# Patient Record
Sex: MALE | Race: WHITE | NOT HISPANIC OR LATINO | URBAN - METROPOLITAN AREA
[De-identification: names, ages, dates, MRNs, and addresses within clinical notes are randomized per-mention and may not be internally consistent; named-entity substitution may affect disease eponyms.]

---

## 2017-03-30 ENCOUNTER — APPOINTMENT (RX ONLY)
Dept: URBAN - METROPOLITAN AREA CLINIC 349 | Facility: CLINIC | Age: 57
Setting detail: DERMATOLOGY
End: 2017-03-30

## 2017-03-30 DIAGNOSIS — Z85.828 PERSONAL HISTORY OF OTHER MALIGNANT NEOPLASM OF SKIN: ICD-10-CM

## 2017-03-30 DIAGNOSIS — L82.1 OTHER SEBORRHEIC KERATOSIS: ICD-10-CM

## 2017-03-30 DIAGNOSIS — L57.0 ACTINIC KERATOSIS: ICD-10-CM

## 2017-03-30 DIAGNOSIS — D18.0 HEMANGIOMA: ICD-10-CM

## 2017-03-30 PROBLEM — D18.01 HEMANGIOMA OF SKIN AND SUBCUTANEOUS TISSUE: Status: ACTIVE | Noted: 2017-03-30

## 2017-03-30 PROBLEM — I10 ESSENTIAL (PRIMARY) HYPERTENSION: Status: ACTIVE | Noted: 2017-03-30

## 2017-03-30 PROCEDURE — 17003 DESTRUCT PREMALG LES 2-14: CPT

## 2017-03-30 PROCEDURE — ? COUNSELING

## 2017-03-30 PROCEDURE — 17000 DESTRUCT PREMALG LESION: CPT

## 2017-03-30 PROCEDURE — ? LIQUID NITROGEN

## 2017-03-30 PROCEDURE — 99213 OFFICE O/P EST LOW 20 MIN: CPT | Mod: 25

## 2017-03-30 ASSESSMENT — LOCATION SIMPLE DESCRIPTION DERM
LOCATION SIMPLE: RIGHT UPPER BACK
LOCATION SIMPLE: UPPER BACK
LOCATION SIMPLE: LEFT FOREHEAD
LOCATION SIMPLE: LEFT UPPER BACK
LOCATION SIMPLE: RIGHT CHEEK

## 2017-03-30 ASSESSMENT — PAIN INTENSITY VAS: HOW INTENSE IS YOUR PAIN 0 BEING NO PAIN, 10 BEING THE MOST SEVERE PAIN POSSIBLE?: NO PAIN

## 2017-03-30 ASSESSMENT — LOCATION DETAILED DESCRIPTION DERM
LOCATION DETAILED: LEFT INFERIOR LATERAL FOREHEAD
LOCATION DETAILED: INFERIOR THORACIC SPINE
LOCATION DETAILED: RIGHT MID-UPPER BACK
LOCATION DETAILED: LEFT MEDIAL UPPER BACK
LOCATION DETAILED: RIGHT SUPERIOR LATERAL BUCCAL CHEEK
LOCATION DETAILED: LEFT MID-UPPER BACK

## 2017-03-30 ASSESSMENT — LOCATION ZONE DERM
LOCATION ZONE: TRUNK
LOCATION ZONE: FACE

## 2017-03-30 NOTE — PROCEDURE: LIQUID NITROGEN
Render Post-Care Instructions In Note?: no
Number Of Freeze-Thaw Cycles: 2 freeze-thaw cycles
Post-Care Instructions: I reviewed with the patient in detail post-care instructions. Patient is to wear sunprotection, and avoid picking at any of the treated lesions. Pt may apply Vaseline to crusted or scabbing areas.
Duration Of Freeze Thaw-Cycle (Seconds): 4
Detail Level: Detailed
Consent: The patient's consent was obtained including but not limited to risks of crusting, scabbing, blistering, scarring, darker or lighter pigmentary change, recurrence, incomplete removal and infection.

## 2017-05-11 ENCOUNTER — APPOINTMENT (RX ONLY)
Dept: URBAN - METROPOLITAN AREA CLINIC 349 | Facility: CLINIC | Age: 57
Setting detail: DERMATOLOGY
End: 2017-05-11

## 2017-05-11 DIAGNOSIS — L23.9 ALLERGIC CONTACT DERMATITIS, UNSPECIFIED CAUSE: ICD-10-CM

## 2017-05-11 PROCEDURE — ? COUNSELING

## 2017-05-11 PROCEDURE — ? PRESCRIPTION

## 2017-05-11 PROCEDURE — ? RECOMMENDATIONS

## 2017-05-11 PROCEDURE — 99213 OFFICE O/P EST LOW 20 MIN: CPT

## 2017-05-11 RX ORDER — TRIAMCINOLONE ACETONIDE 1 MG/G
CREAM TOPICAL
Qty: 1 | Refills: 2 | Status: ERX | COMMUNITY
Start: 2017-05-11

## 2017-05-11 RX ADMIN — TRIAMCINOLONE ACETONIDE: 1 CREAM TOPICAL at 18:22

## 2017-05-11 ASSESSMENT — LOCATION ZONE DERM: LOCATION ZONE: LEG

## 2017-05-11 ASSESSMENT — LOCATION DETAILED DESCRIPTION DERM
LOCATION DETAILED: RIGHT PROXIMAL PRETIBIAL REGION
LOCATION DETAILED: LEFT PROXIMAL PRETIBIAL REGION

## 2017-05-11 ASSESSMENT — LOCATION SIMPLE DESCRIPTION DERM
LOCATION SIMPLE: LEFT PRETIBIAL REGION
LOCATION SIMPLE: RIGHT PRETIBIAL REGION

## 2017-05-11 NOTE — PROCEDURE: RECOMMENDATIONS
Detail Level: Detailed
Recommendations (Free Text): Avoid soaps, lotions and detergents with fragrance \\nTriamcinalone twice daily for two weeks \\nAllegra once daily \\nCerave cream apply twice daily as needed

## 2017-05-25 ENCOUNTER — APPOINTMENT (RX ONLY)
Dept: URBAN - METROPOLITAN AREA CLINIC 349 | Facility: CLINIC | Age: 57
Setting detail: DERMATOLOGY
End: 2017-05-25

## 2017-05-25 DIAGNOSIS — L23.9 ALLERGIC CONTACT DERMATITIS, UNSPECIFIED CAUSE: ICD-10-CM

## 2017-05-25 PROBLEM — L30.9 DERMATITIS, UNSPECIFIED: Status: ACTIVE | Noted: 2017-05-25

## 2017-05-25 PROCEDURE — ? BIOPSY BY PUNCH METHOD

## 2017-05-25 PROCEDURE — A4550 SURGICAL TRAYS: HCPCS

## 2017-05-25 PROCEDURE — 11100: CPT

## 2017-05-25 PROCEDURE — ? RECOMMENDATIONS

## 2017-05-25 PROCEDURE — ? PRESCRIPTION

## 2017-05-25 PROCEDURE — ? COUNSELING

## 2017-05-25 RX ORDER — TRIAMCINOLONE ACETONIDE 1 MG/G
CREAM TOPICAL
Qty: 1 | Refills: 1 | Status: ERX

## 2017-05-25 ASSESSMENT — LOCATION DETAILED DESCRIPTION DERM
LOCATION DETAILED: LEFT PROXIMAL PRETIBIAL REGION
LOCATION DETAILED: RIGHT PROXIMAL PRETIBIAL REGION
LOCATION DETAILED: LEFT DISTAL PRETIBIAL REGION

## 2017-05-25 ASSESSMENT — LOCATION SIMPLE DESCRIPTION DERM
LOCATION SIMPLE: LEFT PRETIBIAL REGION
LOCATION SIMPLE: RIGHT PRETIBIAL REGION

## 2017-05-25 ASSESSMENT — LOCATION ZONE DERM: LOCATION ZONE: LEG

## 2017-05-25 NOTE — PROCEDURE: RECOMMENDATIONS
Recommendations (Free Text): Avoid soaps, lotions and detergents with fragrance \\nTriamcinalone twice daily for two weeks \\nAllegra once daily \\nCerave cream apply twice daily as needed
Detail Level: Detailed

## 2017-05-25 NOTE — PROCEDURE: BIOPSY BY PUNCH METHOD
Patient Will Remove Sutures At Home?: No
Hemostasis: None
Biopsy Type: H and E
Additional Anesthesia Volume In Cc (Will Not Render If 0): 0
Anesthesia Volume In Cc (Will Not Render If 0): 1
Notification Instructions: Patient will be notified of biopsy results. However, patient instructed to call the office if not contacted within 2 weeks.
Detail Level: Detailed
Post-Care Instructions: I reviewed with the patient in detail post-care instructions. Patient is to keep the biopsy site dry overnight, and then apply bacitracin twice daily until healed. Patient may apply hydrogen peroxide soaks to remove any crusting.\\nAft the procedure, the patient was observed for 5-10 minutes and was oriented to person, place, and time.  Denied feeling dizzy, queasy, and stated that they did not feel that they were going to faint.
Suture Removal: 10 days
Wound Care: Vaseline
Accession #: Formal
Bill For Surgical Tray: yes
Anesthesia Type: 1% lidocaine with epinephrine
Punch Size In Mm: 4
Consent: Written consent was obtained and risks were reviewed including but not limited to scarring, infection, bleeding, scabbing, incomplete removal, nerve damage and allergy to anesthesia.
Dressing: bandage
Billing Type: Third-Party Bill
Epidermal Sutures: 4-0 Prolene

## 2017-06-08 ENCOUNTER — APPOINTMENT (RX ONLY)
Dept: URBAN - METROPOLITAN AREA CLINIC 349 | Facility: CLINIC | Age: 57
Setting detail: DERMATOLOGY
End: 2017-06-08

## 2017-06-08 DIAGNOSIS — L20.89 OTHER ATOPIC DERMATITIS: ICD-10-CM

## 2017-06-08 PROBLEM — L20.84 INTRINSIC (ALLERGIC) ECZEMA: Status: ACTIVE | Noted: 2017-06-08

## 2017-06-08 PROCEDURE — ? RECOMMENDATIONS

## 2017-06-08 PROCEDURE — ? COUNSELING

## 2017-06-08 PROCEDURE — ? SUTURE REMOVAL (GLOBAL PERIOD)

## 2017-06-08 PROCEDURE — 99024 POSTOP FOLLOW-UP VISIT: CPT

## 2017-06-08 ASSESSMENT — LOCATION DETAILED DESCRIPTION DERM
LOCATION DETAILED: RIGHT DISTAL PRETIBIAL REGION
LOCATION DETAILED: LEFT PROXIMAL PRETIBIAL REGION

## 2017-06-08 ASSESSMENT — LOCATION ZONE DERM: LOCATION ZONE: LEG

## 2017-06-08 ASSESSMENT — LOCATION SIMPLE DESCRIPTION DERM
LOCATION SIMPLE: RIGHT PRETIBIAL REGION
LOCATION SIMPLE: LEFT PRETIBIAL REGION

## 2017-06-08 NOTE — PROCEDURE: RECOMMENDATIONS
Detail Level: Zone
Recommendations (Free Text): Only use triamcinolone cream twice daily as needed \\nStressed moisturizer

## 2017-07-31 NOTE — PROCEDURE: SUTURE REMOVAL (GLOBAL PERIOD)
No
Add 95480 Cpt? (Important Note: In 2017 The Use Of 60804 Is Being Tracked By Cms To Determine Future Global Period Reimbursement For Global Periods): yes
Detail Level: Detailed

## 2017-08-28 PROBLEM — D69.6 THROMBOCYTOPENIA (HCC): Status: ACTIVE | Noted: 2017-08-28

## 2018-03-01 ENCOUNTER — APPOINTMENT (OUTPATIENT)
Dept: ULTRASOUND IMAGING | Age: 58
End: 2018-03-01
Attending: EMERGENCY MEDICINE
Payer: COMMERCIAL

## 2018-03-01 ENCOUNTER — HOSPITAL ENCOUNTER (EMERGENCY)
Age: 58
Discharge: HOME | End: 2018-03-01
Attending: EMERGENCY MEDICINE
Payer: COMMERCIAL

## 2018-03-01 VITALS
WEIGHT: 202 LBS | HEART RATE: 102 BPM | BODY MASS INDEX: 27.36 KG/M2 | HEIGHT: 72 IN | RESPIRATION RATE: 18 BRPM | TEMPERATURE: 97.6 F | SYSTOLIC BLOOD PRESSURE: 125 MMHG | OXYGEN SATURATION: 95 % | DIASTOLIC BLOOD PRESSURE: 82 MMHG

## 2018-03-01 DIAGNOSIS — K85.90 ACUTE PANCREATITIS, UNSPECIFIED COMPLICATION STATUS, UNSPECIFIED PANCREATITIS TYPE: Primary | ICD-10-CM

## 2018-03-01 LAB
ALBUMIN SERPL-MCNC: 4.2 G/DL (ref 3.5–5)
ALBUMIN/GLOB SERPL: 1.2 {RATIO} (ref 1.2–3.5)
ALP SERPL-CCNC: 56 U/L (ref 50–136)
ALT SERPL-CCNC: 72 U/L (ref 12–65)
ANION GAP SERPL CALC-SCNC: 16 MMOL/L (ref 7–16)
AST SERPL-CCNC: 118 U/L (ref 15–37)
BASOPHILS # BLD: 0.1 K/UL (ref 0–0.2)
BASOPHILS NFR BLD: 2 % (ref 0–2)
BILIRUB SERPL-MCNC: 1.5 MG/DL (ref 0.2–1.1)
BUN SERPL-MCNC: 17 MG/DL (ref 6–23)
CALCIUM SERPL-MCNC: 9.1 MG/DL (ref 8.3–10.4)
CHLORIDE SERPL-SCNC: 97 MMOL/L (ref 98–107)
CO2 SERPL-SCNC: 26 MMOL/L (ref 21–32)
CREAT SERPL-MCNC: 0.78 MG/DL (ref 0.8–1.5)
DIFFERENTIAL METHOD BLD: ABNORMAL
EOSINOPHIL # BLD: 0.1 K/UL (ref 0–0.8)
EOSINOPHIL NFR BLD: 3 % (ref 0.5–7.8)
ERYTHROCYTE [DISTWIDTH] IN BLOOD BY AUTOMATED COUNT: 12.7 % (ref 11.9–14.6)
GLOBULIN SER CALC-MCNC: 3.5 G/DL (ref 2.3–3.5)
GLUCOSE SERPL-MCNC: 118 MG/DL (ref 65–100)
HCT VFR BLD AUTO: 47.8 % (ref 41.1–50.3)
HGB BLD-MCNC: 16.3 G/DL (ref 13.6–17.2)
IMM GRANULOCYTES # BLD: 0 K/UL (ref 0–0.5)
IMM GRANULOCYTES NFR BLD AUTO: 0 % (ref 0–5)
LIPASE SERPL-CCNC: 459 U/L (ref 73–393)
LYMPHOCYTES # BLD: 1.5 K/UL (ref 0.5–4.6)
LYMPHOCYTES NFR BLD: 38 % (ref 13–44)
MCH RBC QN AUTO: 31.7 PG (ref 26.1–32.9)
MCHC RBC AUTO-ENTMCNC: 34.1 G/DL (ref 31.4–35)
MCV RBC AUTO: 93 FL (ref 79.6–97.8)
MONOCYTES # BLD: 0.5 K/UL (ref 0.1–1.3)
MONOCYTES NFR BLD: 12 % (ref 4–12)
NEUTS SEG # BLD: 1.8 K/UL (ref 1.7–8.2)
NEUTS SEG NFR BLD: 45 % (ref 43–78)
PLATELET # BLD AUTO: 119 K/UL (ref 150–450)
PMV BLD AUTO: 11.1 FL (ref 10.8–14.1)
POTASSIUM SERPL-SCNC: 3.5 MMOL/L (ref 3.5–5.1)
PROT SERPL-MCNC: 7.7 G/DL (ref 6.3–8.2)
RBC # BLD AUTO: 5.14 M/UL (ref 4.23–5.67)
SODIUM SERPL-SCNC: 139 MMOL/L (ref 136–145)
WBC # BLD AUTO: 4 K/UL (ref 4.3–11.1)

## 2018-03-01 PROCEDURE — 76700 US EXAM ABDOM COMPLETE: CPT

## 2018-03-01 PROCEDURE — 96375 TX/PRO/DX INJ NEW DRUG ADDON: CPT | Performed by: EMERGENCY MEDICINE

## 2018-03-01 PROCEDURE — 74011250636 HC RX REV CODE- 250/636: Performed by: EMERGENCY MEDICINE

## 2018-03-01 PROCEDURE — 81003 URINALYSIS AUTO W/O SCOPE: CPT | Performed by: EMERGENCY MEDICINE

## 2018-03-01 PROCEDURE — 96361 HYDRATE IV INFUSION ADD-ON: CPT | Performed by: EMERGENCY MEDICINE

## 2018-03-01 PROCEDURE — 80053 COMPREHEN METABOLIC PANEL: CPT | Performed by: EMERGENCY MEDICINE

## 2018-03-01 PROCEDURE — 85025 COMPLETE CBC W/AUTO DIFF WBC: CPT | Performed by: EMERGENCY MEDICINE

## 2018-03-01 PROCEDURE — 96374 THER/PROPH/DIAG INJ IV PUSH: CPT | Performed by: EMERGENCY MEDICINE

## 2018-03-01 PROCEDURE — 83690 ASSAY OF LIPASE: CPT | Performed by: EMERGENCY MEDICINE

## 2018-03-01 PROCEDURE — 74011000258 HC RX REV CODE- 258: Performed by: EMERGENCY MEDICINE

## 2018-03-01 PROCEDURE — 74011000250 HC RX REV CODE- 250: Performed by: EMERGENCY MEDICINE

## 2018-03-01 PROCEDURE — 99283 EMERGENCY DEPT VISIT LOW MDM: CPT | Performed by: EMERGENCY MEDICINE

## 2018-03-01 RX ORDER — THIAMINE HYDROCHLORIDE 100 MG/ML
100 INJECTION, SOLUTION INTRAMUSCULAR; INTRAVENOUS
Status: DISCONTINUED | OUTPATIENT
Start: 2018-03-01 | End: 2018-03-01 | Stop reason: SDUPTHER

## 2018-03-01 RX ORDER — ONDANSETRON 8 MG/1
8 TABLET, ORALLY DISINTEGRATING ORAL
Qty: 8 TAB | Refills: 1 | Status: SHIPPED | OUTPATIENT
Start: 2018-03-01

## 2018-03-01 RX ORDER — LORAZEPAM 0.5 MG/1
1 TABLET ORAL
Qty: 20 TAB | Refills: 0 | Status: SHIPPED | OUTPATIENT
Start: 2018-03-01 | End: 2018-04-03 | Stop reason: ALTCHOICE

## 2018-03-01 RX ORDER — ONDANSETRON 2 MG/ML
4 INJECTION INTRAMUSCULAR; INTRAVENOUS
Status: COMPLETED | OUTPATIENT
Start: 2018-03-01 | End: 2018-03-01

## 2018-03-01 RX ORDER — FAMOTIDINE 10 MG/ML
20 INJECTION INTRAVENOUS
Status: COMPLETED | OUTPATIENT
Start: 2018-03-01 | End: 2018-03-01

## 2018-03-01 RX ORDER — HYDROCODONE BITARTRATE AND ACETAMINOPHEN 5; 325 MG/1; MG/1
1 TABLET ORAL
Qty: 8 TAB | Refills: 0 | Status: SHIPPED | OUTPATIENT
Start: 2018-03-01 | End: 2018-04-03 | Stop reason: ALTCHOICE

## 2018-03-01 RX ADMIN — ONDANSETRON 4 MG: 2 INJECTION INTRAMUSCULAR; INTRAVENOUS at 12:57

## 2018-03-01 RX ADMIN — THIAMINE HYDROCHLORIDE 100 MG: 100 INJECTION, SOLUTION INTRAMUSCULAR; INTRAVENOUS at 13:16

## 2018-03-01 RX ADMIN — FAMOTIDINE 20 MG: 10 INJECTION, SOLUTION INTRAVENOUS at 12:57

## 2018-03-01 RX ADMIN — SODIUM CHLORIDE 1000 ML: 900 INJECTION, SOLUTION INTRAVENOUS at 12:57

## 2018-03-01 NOTE — LETTER
[facility] EMERGENCY DEPT 
[address] [place] [address] [id number] [phone] Work/School Note Date: 3/1/2018 To Whom It May concern: 
 
Braxton Redmond was seen and treated today in the emergency room by the following provider(s): 
Attending Provider: Angelo Hester MD. Braxton Nyla Special Instructions: Work excuse for today, tomorrow, Saturday Sincerely, 
 
 
 
 
Angelo Hester MD

## 2018-03-01 NOTE — ED PROVIDER NOTES
HPI Comments: Here with nausea and vomiting that has been ongoing for approximately one week. No lower GI symptoms. No history of similar in the past.  Patient has alcohol related issues and drinks a pint of vodka per day. No history of DTs or alcohol withdrawal issues. Past as having prostate cancer but denies any abdominal surgeries. No history of pancreatitis. No history of peptic ulcer disease. called physician's office yesterday who recommended he go to the emergency room. Chin states he drinks 1 pint of vodka per day    Patient is a 65 y.o. male presenting with vomiting and weight loss >10% of BW in last 6 months. The history is provided by the patient and the spouse. Vomiting    This is a new problem. The current episode started more than 2 days ago. The problem has not changed since onset. There has been no fever. Associated symptoms include abdominal pain. Pertinent negatives include no diarrhea, no myalgias, no cough and no URI. Risk factors include alcohol. His pertinent negatives include no gastric bypass and no DM. Weight Loss    Associated symptoms include vomiting. Pertinent negatives include no diarrhea and no myalgias. His past medical history does not include DM. Past Medical History:   Diagnosis Date    Cancer [facility])     prostate, removed in 03/2014    Hypertension     Personal history of prostate cancer        Past Surgical History:   Procedure Laterality Date    BIOPSY PROSTATE      HX OTHER SURGICAL  03/2015    skin cancer removed off of back     HX PROSTATECTOMY      removed march 2014         Family History:   Problem Relation Age of Onset    Cancer Mother     Cancer Father     Cancer Brother        Social History     Social History    Marital status: SINGLE     Spouse name: N/A    Number of children: N/A    Years of education: N/A     Occupational History    Not on file.      Social History Main Topics    Smoking status: Never Smoker    Smokeless tobacco: Never Used    Alcohol use Yes      Comment: occ    Drug use: No    Sexual activity: Yes     Partners: Female     Birth control/ protection: None     Other Topics Concern    Not on file     Social History Narrative         ALLERGIES: Flomax [tamsulosin]    Review of Systems   Respiratory: Negative for cough. Gastrointestinal: Positive for abdominal pain and vomiting. Negative for diarrhea. Musculoskeletal: Negative for myalgias. Vitals:    03/01/18 1120   BP: 125/82   Pulse: (!) 102   Resp: 18   Temp: 97.6 °F (36.4 °C)   SpO2: 95%   Weight: 91.6 kg (202 lb)   Height: 6' (1.829 m)            Physical Exam   Constitutional: He appears well-developed and well-nourished. No distress. Initially somewhat flushed complexion and appears to be uncomfortable but not toxic or septic   HENT:   Head: Atraumatic. Somewhat dry mucous membranes   Eyes: No scleral icterus. Neck: Neck supple. Cardiovascular: Normal rate and intact distal pulses. Pulmonary/Chest: Effort normal. No respiratory distress. He has no wheezes. Abdominal: He exhibits no distension and no mass. There is no rebound and no guarding. Minimal abdominal discomfort. No abdominal wall defect. Neurological: He is alert. He exhibits normal muscle tone. Coordination normal.   Skin: Skin is warm and dry. Psychiatric: Thought content normal.   Nursing note and vitals reviewed. MDM  Number of Diagnoses or Management Options  Acute pancreatitis, unspecified complication status, unspecified pancreatitis type:   Diagnosis management comments: rrecurrent nausea and vomiting with findings consistent with mild pancreatitis. Admits to history of alcohol abuse.     No history of pancreatitis in past       Amount and/or Complexity of Data Reviewed  Clinical lab tests: ordered  Obtain history from someone other than the patient: yes  Independent visualization of images, tracings, or specimens: yes    Risk of Complications, Morbidity, and/or Mortality  Presenting problems: high  Diagnostic procedures: low  Management options: moderate  General comments: Advised inpatient as this is first case of probable pancreatitis but declines. Is much better in Er and up and walking around room in ER. aware need to return with worsening    Patient Progress  Patient progress: improved        ED Course       Procedures      Recent Results (from the past 12 hour(s))   CBC WITH AUTOMATED DIFF    Collection Time: 03/01/18 11:27 AM   Result Value Ref Range    WBC 4.0 (L) 4.3 - 11.1 K/uL    RBC 5.14 4.23 - 5.67 M/uL    HGB 16.3 13.6 - 17.2 g/dL    HCT 47.8 41.1 - 50.3 %    MCV 93.0 79.6 - 97.8 FL    MCH 31.7 26.1 - 32.9 PG    MCHC 34.1 31.4 - 35.0 g/dL    RDW 12.7 11.9 - 14.6 %    PLATELET 086 (L) 199 - 450 K/uL    MPV 11.1 10.8 - 14.1 FL    DF AUTOMATED      NEUTROPHILS 45 43 - 78 %    LYMPHOCYTES 38 13 - 44 %    MONOCYTES 12 4.0 - 12.0 %    EOSINOPHILS 3 0.5 - 7.8 %    BASOPHILS 2 0.0 - 2.0 %    IMMATURE GRANULOCYTES 0 0.0 - 5.0 %    ABS. NEUTROPHILS 1.8 1.7 - 8.2 K/UL    ABS. LYMPHOCYTES 1.5 0.5 - 4.6 K/UL    ABS. MONOCYTES 0.5 0.1 - 1.3 K/UL    ABS. EOSINOPHILS 0.1 0.0 - 0.8 K/UL    ABS. BASOPHILS 0.1 0.0 - 0.2 K/UL    ABS. IMM. GRANS. 0.0 0.0 - 0.5 K/UL   METABOLIC PANEL, COMPREHENSIVE    Collection Time: 03/01/18 11:27 AM   Result Value Ref Range    Sodium 139 136 - 145 mmol/L    Potassium 3.5 3.5 - 5.1 mmol/L    Chloride 97 (L) 98 - 107 mmol/L    CO2 26 21 - 32 mmol/L    Anion gap 16 7 - 16 mmol/L    Glucose 118 (H) 65 - 100 mg/dL    BUN 17 6 - 23 MG/DL    Creatinine 0.78 (L) 0.8 - 1.5 MG/DL    GFR est AA >60 >60 ml/min/1.73m2    GFR est non-AA >60 >60 ml/min/1.73m2    Calcium 9.1 8.3 - 10.4 MG/DL    Bilirubin, total 1.5 (H) 0.2 - 1.1 MG/DL    ALT (SGPT) 72 (H) 12 - 65 U/L    AST (SGOT) 118 (H) 15 - 37 U/L    Alk.  phosphatase 56 50 - 136 U/L    Protein, total 7.7 6.3 - 8.2 g/dL    Albumin 4.2 3.5 - 5.0 g/dL    Globulin 3.5 2.3 - 3.5 g/dL    A-G Ratio 1.2 1.2 - 3.5 LIPASE    Collection Time: 03/01/18 11:27 AM   Result Value Ref Range    Lipase 459 (H) 73 - 393 U/L

## 2018-03-01 NOTE — ED TRIAGE NOTES
Patient complains of vomiting for 7 days.  Per spouse patient has lost 15 lbs in 7 days, patient is unable to eat or drink

## 2018-03-01 NOTE — Clinical Note
No alcohol Ativan prescribed for withdrawal symptoms Nausea, as needed: Zofran May wish to additionally use some OTC  Prilosec or Nexium Return with any worsening Contact Dr. Lakhwinder Nelson for close follow-up and ongoing workup since not admitted today

## 2018-03-01 NOTE — DISCHARGE INSTRUCTIONS
Pancreatitis: Care Instructions  Your Care Instructions    The pancreas is an organ behind the stomach. It makes hormones and enzymes to help your body digest food. But if these enzymes attack the pancreas, it can get inflamed. This is called pancreatitis. Most cases are caused by gallstones or by heavy alcohol use. If you take care of yourself at home, it will help you get better. It will also help you avoid more problems with your pancreas. Follow-up care is a key part of your treatment and safety. Be sure to make and go to all appointments, and call your doctor if you are having problems. It's also a good idea to know your test results and keep a list of the medicines you take. How can you care for yourself at home? · Drink clear liquids and eat bland foods until you feel better. [place] foods include rice, dry toast, and crackers. They also include bananas and applesauce. · Eat a low-fat diet until your doctor says your pancreas is healed. · Do not drink alcohol. Tell your doctor if you need help to quit. Counseling, support groups, and sometimes medicines can help you stay sober. · Be safe with medicines. Read and follow all instructions on the label. ¨ If the doctor gave you a prescription medicine for pain, take it as prescribed. ¨ If you are not taking a prescription pain medicine, ask your doctor if you can take an over-the-counter medicine. · If your doctor prescribed antibiotics, take them as directed. Do not stop taking them just because you feel better. You need to take the full course of antibiotics. · Get extra rest until you feel better. To prevent future problems with your pancreas  · Do not drink alcohol. · Tell your doctors and pharmacist that you've had pancreatitis. They can help you avoid medicines that may cause this problem again. When should you call for help? Call 911 anytime you think you may need emergency care.  For example, call if:  ? · You vomit blood or what looks like coffee grounds. ? · Your stools are maroon or very bloody. ?Call your doctor now or seek immediate medical care if:  ? · You have new or worse belly pain. ? · Your stools are black and look like tar, or they have streaks of blood. ? · You are vomiting. ? Watch closely for changes in your health, and be sure to contact your doctor if:  ? · You do not get better as expected. Where can you learn more? Go to http://david-karen.info/. Enter F867 in the search box to learn more about \"Pancreatitis: Care Instructions. \"  Current as of: May 12, 2017  Content Version: 11.4  © 8792-1584 [employer]. Care instructions adapted under license by [employer] (which disclaims liability or warranty for this information). If you have questions about a medical condition or this instruction, always ask your healthcare professional. [address] any warranty or liability for your use of this information. Learning About Acute Pancreatitis  What is acute pancreatitis? The pancreas is an organ behind the stomach. It makes hormones like insulin that help control how your body uses sugar. It also makes enzymes that help you digest food. Usually these enzymes flow from the pancreas to the intestines. But if they leak into the pancreas, they can irritate it and cause pain and swelling. When this happens suddenly, it's called acute pancreatitis. What causes it? Most of the time, acute pancreatitis is caused by gallstones or by heavy alcohol use. But several other things can cause it, such as:  · High levels of fats in the blood. · An injury. · A problem after a surgery or a procedure. · Certain medicines. What are the symptoms? The main symptom is pain in the upper belly. The pain can be severe. You also may have a fever, nausea, or vomiting. Some people get so sick that they have problems breathing. They also may have low blood pressure.   How is it diagnosed? Your doctor will diagnose pancreatitis with an exam and by looking at your lab tests. Your doctor may think that you have this problem based on your symptoms and where you have pain in your belly. You may have blood tests of enzymes called amylase and lipase. In pancreatitis, the level of these enzymes is usually much higher than normal.  You also may have imaging tests of the belly. These may include an ultrasound, a CT scan, or an MRI. A special MRI called MRCP can show images of the bile ducts. This test can be very helpful when gallstones are causing the problem. How is it treated? Treatment of acute pancreatitis usually takes place in the hospital. It focuses on taking care of pain and supporting your body while your pancreas heals. In severe cases, treatment may occur in an intensive care unit to support breathing, treat serious infections, or help raise very low blood pressure. If a gallstone is causing the problem, the gallstone may need to be removed. This is done during a procedure called ERCP. The doctor puts a scope in your mouth and moves it gently through the stomach and into the ducts from the pancreas and gallbladder. The doctor is then able to see a stone and remove it. Sometimes the gallbladder, which makes gallstones, needs to be removed by surgery. People with pancreatitis often need a lot of fluid to help support their other organs and their blood pressure. They get fluids through a vein (intravenous, or IV). Instead of food by mouth, nutrition is sometimes given through a vein while the pancreas heals. Follow-up care is a key part of your treatment and safety. Be sure to make and go to all appointments, and call your doctor if you are having problems. It's also a good idea to know your test results and keep a list of the medicines you take. Where can you learn more? Go to http://david-karen.info/.   Enter Q369 in the search box to learn more about \"Learning About Acute Pancreatitis. \"  Current as of: May 12, 2017  Content Version: 11.4  © 8279-7435 [employer]. Care instructions adapted under license by [employer] (which disclaims liability or warranty for this information). If you have questions about a medical condition or this instruction, always ask your healthcare professional. [place] [address] any warranty or liability for your use of this information.

## 2018-03-12 ENCOUNTER — HOSPITAL ENCOUNTER (EMERGENCY)
Age: 58
Discharge: HOME | End: 2018-03-12
Attending: EMERGENCY MEDICINE
Payer: COMMERCIAL

## 2018-03-12 VITALS
HEIGHT: 72 IN | RESPIRATION RATE: 16 BRPM | DIASTOLIC BLOOD PRESSURE: 82 MMHG | WEIGHT: 202 LBS | BODY MASS INDEX: 27.36 KG/M2 | SYSTOLIC BLOOD PRESSURE: 138 MMHG | TEMPERATURE: 98.3 F | OXYGEN SATURATION: 99 % | HEART RATE: 88 BPM

## 2018-03-12 DIAGNOSIS — F10.10 ALCOHOL ABUSE: Primary | ICD-10-CM

## 2018-03-12 LAB
ALBUMIN SERPL-MCNC: 3.8 G/DL (ref 3.5–5)
ALBUMIN/GLOB SERPL: 1.3 {RATIO} (ref 1.2–3.5)
ALP SERPL-CCNC: 51 U/L (ref 50–136)
ALT SERPL-CCNC: 55 U/L (ref 12–65)
ANION GAP SERPL CALC-SCNC: 16 MMOL/L (ref 7–16)
AST SERPL-CCNC: 102 U/L (ref 15–37)
ATRIAL RATE: 89 BPM
BASOPHILS # BLD: 0 K/UL (ref 0–0.2)
BASOPHILS NFR BLD: 1 % (ref 0–2)
BILIRUB SERPL-MCNC: 0.9 MG/DL (ref 0.2–1.1)
BUN SERPL-MCNC: 11 MG/DL (ref 6–23)
CALCIUM SERPL-MCNC: 8.4 MG/DL (ref 8.3–10.4)
CALCULATED P AXIS, ECG09: 75 DEGREES
CALCULATED R AXIS, ECG10: 4 DEGREES
CALCULATED T AXIS, ECG11: 52 DEGREES
CHLORIDE SERPL-SCNC: 102 MMOL/L (ref 98–107)
CO2 SERPL-SCNC: 24 MMOL/L (ref 21–32)
CREAT SERPL-MCNC: 0.8 MG/DL (ref 0.8–1.5)
DIAGNOSIS, 93000: NORMAL
DIFFERENTIAL METHOD BLD: ABNORMAL
EOSINOPHIL # BLD: 0.1 K/UL (ref 0–0.8)
EOSINOPHIL NFR BLD: 3 % (ref 0.5–7.8)
ERYTHROCYTE [DISTWIDTH] IN BLOOD BY AUTOMATED COUNT: 12.8 % (ref 11.9–14.6)
ETHANOL SERPL-MCNC: 319 MG/DL
GLOBULIN SER CALC-MCNC: 3 G/DL (ref 2.3–3.5)
GLUCOSE SERPL-MCNC: 100 MG/DL (ref 65–100)
HCT VFR BLD AUTO: 46.1 % (ref 41.1–50.3)
HGB BLD-MCNC: 15.5 G/DL (ref 13.6–17.2)
IMM GRANULOCYTES # BLD: 0 K/UL (ref 0–0.5)
IMM GRANULOCYTES NFR BLD AUTO: 0 % (ref 0–5)
LIPASE SERPL-CCNC: 437 U/L (ref 73–393)
LYMPHOCYTES # BLD: 0.9 K/UL (ref 0.5–4.6)
LYMPHOCYTES NFR BLD: 32 % (ref 13–44)
MCH RBC QN AUTO: 31.6 PG (ref 26.1–32.9)
MCHC RBC AUTO-ENTMCNC: 33.6 G/DL (ref 31.4–35)
MCV RBC AUTO: 93.9 FL (ref 79.6–97.8)
MONOCYTES # BLD: 0.3 K/UL (ref 0.1–1.3)
MONOCYTES NFR BLD: 11 % (ref 4–12)
NEUTS SEG # BLD: 1.5 K/UL (ref 1.7–8.2)
NEUTS SEG NFR BLD: 53 % (ref 43–78)
P-R INTERVAL, ECG05: 200 MS
PLATELET # BLD AUTO: 75 K/UL (ref 150–450)
PMV BLD AUTO: 11.8 FL (ref 10.8–14.1)
POTASSIUM SERPL-SCNC: 3.4 MMOL/L (ref 3.5–5.1)
PROT SERPL-MCNC: 6.8 G/DL (ref 6.3–8.2)
Q-T INTERVAL, ECG07: 338 MS
QRS DURATION, ECG06: 90 MS
QTC CALCULATION (BEZET), ECG08: 411 MS
RBC # BLD AUTO: 4.91 M/UL (ref 4.23–5.67)
SODIUM SERPL-SCNC: 142 MMOL/L (ref 136–145)
VENTRICULAR RATE, ECG03: 89 BPM
WBC # BLD AUTO: 2.9 K/UL (ref 4.3–11.1)

## 2018-03-12 PROCEDURE — 99284 EMERGENCY DEPT VISIT MOD MDM: CPT | Performed by: EMERGENCY MEDICINE

## 2018-03-12 PROCEDURE — 93005 ELECTROCARDIOGRAM TRACING: CPT | Performed by: EMERGENCY MEDICINE

## 2018-03-12 PROCEDURE — 83690 ASSAY OF LIPASE: CPT | Performed by: EMERGENCY MEDICINE

## 2018-03-12 PROCEDURE — 80053 COMPREHEN METABOLIC PANEL: CPT | Performed by: EMERGENCY MEDICINE

## 2018-03-12 PROCEDURE — 74011250637 HC RX REV CODE- 250/637: Performed by: EMERGENCY MEDICINE

## 2018-03-12 PROCEDURE — 96374 THER/PROPH/DIAG INJ IV PUSH: CPT | Performed by: EMERGENCY MEDICINE

## 2018-03-12 PROCEDURE — 96361 HYDRATE IV INFUSION ADD-ON: CPT | Performed by: EMERGENCY MEDICINE

## 2018-03-12 PROCEDURE — 85025 COMPLETE CBC W/AUTO DIFF WBC: CPT | Performed by: EMERGENCY MEDICINE

## 2018-03-12 PROCEDURE — 80307 DRUG TEST PRSMV CHEM ANLYZR: CPT | Performed by: EMERGENCY MEDICINE

## 2018-03-12 PROCEDURE — 74011250636 HC RX REV CODE- 250/636: Performed by: EMERGENCY MEDICINE

## 2018-03-12 RX ORDER — CHLORDIAZEPOXIDE HYDROCHLORIDE 25 MG/1
25 CAPSULE, GELATIN COATED ORAL
Status: COMPLETED | OUTPATIENT
Start: 2018-03-12 | End: 2018-03-12

## 2018-03-12 RX ORDER — CHLORDIAZEPOXIDE HYDROCHLORIDE 25 MG/1
25 CAPSULE, GELATIN COATED ORAL
Qty: 12 CAP | Refills: 0 | Status: SHIPPED | OUTPATIENT
Start: 2018-03-12

## 2018-03-12 RX ORDER — ONDANSETRON 2 MG/ML
4 INJECTION INTRAMUSCULAR; INTRAVENOUS
Status: COMPLETED | OUTPATIENT
Start: 2018-03-12 | End: 2018-03-12

## 2018-03-12 RX ADMIN — ONDANSETRON 4 MG: 2 INJECTION INTRAMUSCULAR; INTRAVENOUS at 16:29

## 2018-03-12 RX ADMIN — CHLORDIAZEPOXIDE HYDROCHLORIDE 25 MG: 25 CAPSULE ORAL at 18:23

## 2018-03-12 RX ADMIN — SODIUM CHLORIDE 1000 ML: 900 INJECTION, SOLUTION INTRAVENOUS at 16:29

## 2018-03-12 NOTE — DISCHARGE INSTRUCTIONS
Learning About Alcohol Misuse  What is alcohol misuse? Alcohol misuse means drinking so much that it causes problems for you or others. Early problems with alcohol can start at home. You may argue with loved ones about how much you're drinking. Your job may be affected because of drinking. You may drink when it's dangerous or illegal, such as when you drive. Drinking too much for a long time can lead to health conditions like high blood pressure and liver problems. What are the symptoms? Symptoms of alcohol misuse may include:  · Drinking much more than you planned. · Drinking even though it's causing problems for you or others. · Putting yourself in situations where you might get hurt. · Wanting to cut down or stop drinking, but not being able to. · Feeling guilty about how much you're drinking. How is alcohol misuse treated? Getting help for problems with alcohol is up to you. But you don't have to do it alone. There are many people and kinds of treatments to help with alcohol problems. Talking to your doctor is the first step. When you get a doctor's help, treatment for alcohol problems can be safer and quicker. Treatment options can include:  · Treatment programs. Examples are group therapy, one or more types of counseling, and alcohol education. · Medicines. A doctor or counselor can help you know what kinds of medicines might help with cravings. · Free social support groups. These groups include AA (Alcoholics Anonymous) and SMART (Self-Management and Recovery Training). Your doctor can help you decide which type of program is best for you. Follow-up care is a key part of your treatment and safety. Be sure to make and go to all appointments, and call your doctor if you are having problems. It's also a good idea to know your test results and keep a list of the medicines you take. Where can you learn more? Go to http://david-karen.info/.   Enter [id number] in the search box to learn more about \"Learning About Alcohol Misuse. \"  Current as of: November 3, 2016  Content Version: 11.4  © 7950-3687 Healthwise[employer]. Care instructions adapted under license by [employer] (which disclaims liability or warranty for this information). If you have questions about a medical condition or this instruction, always ask your healthcare professional. [address] any warranty or liability for your use of this information.

## 2018-03-12 NOTE — ED PROVIDER NOTES
HPI Comments: Patient is a 76-year-old male with history of hypertension who presents to the ER today complaining of being \"sick\" for the past several weeks. He reports that he was seen here a few weeks ago and diagnosed with pancreatitis. He refused admission at that time. He thinks it was likely related to his alcohol use. He states he is currently still drinking alcohol and was drinking earlier today. He complains of nausea, vomiting, and diarrhea. He denies any abdominal pain. Patient is a 65 y.o. male presenting with abdominal pain. The history is provided by the patient. Abdominal Pain    This is a chronic problem. The current episode started more than 1 week ago. The problem has not changed since onset. The pain is associated with vomiting. Associated symptoms include diarrhea, nausea and vomiting. Pertinent negatives include no fever, no melena, no dysuria, no frequency, no trauma, no chest pain and no back pain. Past workup includes ultrasound. The patient's surgical history includes appendectomy. Past Medical History:   Diagnosis Date    Cancer [facility])     prostate, removed in 03/2014    Hypertension     Personal history of prostate cancer        Past Surgical History:   Procedure Laterality Date    BIOPSY PROSTATE      HX OTHER SURGICAL  03/2015    skin cancer removed off of back     HX PROSTATECTOMY      removed march 2014         Family History:   Problem Relation Age of Onset    Cancer Mother     Cancer Father     Cancer Brother        Social History     Social History    Marital status:      Spouse name: N/A    Number of children: N/A    Years of education: N/A     Occupational History    Not on file.      Social History Main Topics    Smoking status: Never Smoker    Smokeless tobacco: Never Used    Alcohol use Yes      Comment: occ    Drug use: No    Sexual activity: Yes     Partners: Female     Birth control/ protection: None     Other Topics Concern    Not on file     Social History Narrative         ALLERGIES: Flomax [tamsulosin]    Review of Systems   Constitutional: Negative for chills and fever. HENT: Negative. Eyes: Negative. Respiratory: Negative. Cardiovascular: Negative for chest pain. Gastrointestinal: Positive for abdominal pain, diarrhea, nausea and vomiting. Negative for melena. Endocrine: Negative. Genitourinary: Negative for dysuria and frequency. Musculoskeletal: Negative for back pain. Skin: Negative. Neurological: Positive for dizziness and weakness. Vitals:    03/12/18 1528   BP: (!) 140/100   Pulse: (!) 125   Resp: 18   Temp: 97.5 °F (36.4 °C)   SpO2: 97%   Weight: 91.6 kg (202 lb)   Height: 6' (1.829 m)            Physical Exam   Constitutional: He is oriented to person, place, and time. He appears well-developed and well-nourished. HENT:   Head: Normocephalic and atraumatic. Eyes: Conjunctivae and EOM are normal. Pupils are equal, round, and reactive to light. Cardiovascular: Tachycardia present. Pulmonary/Chest: Effort normal and breath sounds normal. No respiratory distress. Abdominal: Soft. There is no tenderness. There is no rebound and no guarding. Musculoskeletal: Normal range of motion. He exhibits no edema. Neurological: He is alert and oriented to person, place, and time. He has normal strength. No cranial nerve deficit or sensory deficit. GCS eye subscore is 4. GCS verbal subscore is 5. GCS motor subscore is 6. Skin: Skin is warm and dry. No rash noted. Nursing note and vitals reviewed. MDM  Number of Diagnoses or Management Options  Diagnosis management comments: Differential diagnosis includes alcohol abuse, alcohol withdrawal, pancreatitis, dehydration, anemia    Prior records and labs were reviewed from visit 2 weeks ago. He also had a negative ultrasound with a normal gallbladder at that time.     EKG shows normal sinus rhythm at a rate of 89 with no acute ischemic changes Amount and/or Complexity of Data Reviewed  Clinical lab tests: ordered and reviewed  Review and summarize past medical records: yes  Independent visualization of images, tracings, or specimens: yes    Risk of Complications, Morbidity, and/or Mortality  Presenting problems: moderate  Diagnostic procedures: low  Management options: low    Patient Progress  Patient progress: improved        ED Course   6:08 PM  He is feeling much better after the liter of IV fluids. He is not tachycardic at this time. He has not had any seizures. He shows no signs of DTs. His blood alcohol level is elevated at 300 and he admits to drinking prior to coming here. His lipase is slightly elevated at 450 but consistent with the values done 2 weeks ago. At that time he also had an ultrasound which was negative. His bilirubin is actually improved today. Clinically I do not think he needs admission to the hospital.  I will refer him to the [facility] for outpatient detox services and I will prescribe him a course of Librium. Voice dictation software was used during the making of this note. This software is not perfect and grammatical and other typographical errors may be present. This note has been proofread, but may still contain errors.   Yesenia Schwartz MD; 3/12/2018 @6:09 PM   ===================================================================        Procedures

## 2018-03-12 NOTE — ED NOTES
I have reviewed discharge instructions with the patient and spouse. The patient and spouse verbalized understanding. Patient left ED via Discharge Method: ambulatory to Home with spouse. Opportunity for questions and clarification provided. Patient given 1 scripts. To continue your aftercare when you leave the hospital, you may receive an automated call from our care team to check in on how you are doing. This is a free service and part of our promise to provide the best care and service to meet your aftercare needs.  If you have questions, or wish to unsubscribe from this service please call [phone]. Thank you for Choosing our [address] [place] [address] Emergency Department.

## 2018-03-12 NOTE — ED TRIAGE NOTES
PMD-Dr Mina Bustamante. Pt was seen here on 3/1/18 and was dx with pancreatitis. Did not want to be admitted at the time. Unable to keep food down. Has continued to drink. Last alcohol was a pint of vodka today.

## 2018-03-12 NOTE — LETTER
[facility] EMERGENCY DEPT 
[address] [place] [id number] 
[phone] Work/School Note Date: 3/12/2018 To Whom It May concern: 
 
Ranjeet Farmer was seen and treated today in the emergency room by the following provider(s): 
No providers found. Ranjeet Farmer may return to work on 3/15/18. Sincerely, Celsa Springer RN

## 2018-04-03 PROBLEM — F10.20 ALCOHOLIC (HCC): Status: ACTIVE | Noted: 2018-04-03

## 2018-07-20 ENCOUNTER — APPOINTMENT (RX ONLY)
Dept: URBAN - METROPOLITAN AREA CLINIC 349 | Facility: CLINIC | Age: 58
Setting detail: DERMATOLOGY
End: 2018-07-20

## 2018-07-20 DIAGNOSIS — L30.0 NUMMULAR DERMATITIS: ICD-10-CM

## 2018-07-20 PROCEDURE — ? PRESCRIPTION

## 2018-07-20 PROCEDURE — ? COUNSELING

## 2018-07-20 PROCEDURE — ? TREATMENT REGIMEN

## 2018-07-20 PROCEDURE — 87220 TISSUE EXAM FOR FUNGI: CPT

## 2018-07-20 PROCEDURE — 99213 OFFICE O/P EST LOW 20 MIN: CPT

## 2018-07-20 PROCEDURE — ? KOH PREP

## 2018-07-20 RX ORDER — HALCINONIDE 1 MG/G
OINTMENT TOPICAL
Qty: 1 | Refills: 1 | Status: ERX | COMMUNITY
Start: 2018-07-20

## 2018-07-20 RX ADMIN — HALCINONIDE: 1 OINTMENT TOPICAL at 14:17

## 2018-07-20 ASSESSMENT — LOCATION SIMPLE DESCRIPTION DERM
LOCATION SIMPLE: RIGHT PRETIBIAL REGION
LOCATION SIMPLE: RIGHT ANKLE
LOCATION SIMPLE: LEFT PRETIBIAL REGION

## 2018-07-20 ASSESSMENT — LOCATION DETAILED DESCRIPTION DERM
LOCATION DETAILED: RIGHT ANKLE
LOCATION DETAILED: RIGHT PROXIMAL PRETIBIAL REGION
LOCATION DETAILED: LEFT PROXIMAL PRETIBIAL REGION
LOCATION DETAILED: LEFT DISTAL PRETIBIAL REGION

## 2018-07-20 ASSESSMENT — LOCATION ZONE DERM: LOCATION ZONE: LEG

## 2018-07-20 NOTE — PROCEDURE: TREATMENT REGIMEN
Initiate Treatment: Halog apply to the affected areas twice daily for two weeks. \\nThen once daily for one week then mwf then discontinue
Samples Given: Halog ointment
Discontinue Regimen: Triamcinolone
Detail Level: Zone
Plan: Stop using Dial soap\\nStart using a Dove or Aveeno

## 2018-07-20 NOTE — HPI: RASH
How Severe Is Your Rash?: moderate
Is This A New Presentation, Or A Follow-Up?: Rash
Additional History: Using Triamcinolone cream given at previous visit.

## 2018-07-20 NOTE — PROCEDURE: KOH PREP
Detail Level: Detailed
Showing: no hyphae
Koh Intro Text (From The.....): A KOH prep was ordered and evaluated from the
Koh Procedure Text (Tissue Harvesting Technique): A 15-blade scalpel was used to scrape the skin. The skin scrapings were placed on a glass slide, covered with a coverslip and a KOH solution was applied.

## 2018-08-01 ENCOUNTER — APPOINTMENT (RX ONLY)
Dept: URBAN - METROPOLITAN AREA CLINIC 349 | Facility: CLINIC | Age: 58
Setting detail: DERMATOLOGY
End: 2018-08-01

## 2018-08-01 DIAGNOSIS — L29.89 OTHER PRURITUS: ICD-10-CM

## 2018-08-01 DIAGNOSIS — L30.9 DERMATITIS, UNSPECIFIED: ICD-10-CM

## 2018-08-01 DIAGNOSIS — L29.8 OTHER PRURITUS: ICD-10-CM

## 2018-08-01 PROCEDURE — A4550 SURGICAL TRAYS: HCPCS

## 2018-08-01 PROCEDURE — ? BIOPSY BY PUNCH METHOD

## 2018-08-01 PROCEDURE — 11100: CPT

## 2018-08-01 PROCEDURE — 96372 THER/PROPH/DIAG INJ SC/IM: CPT | Mod: 59

## 2018-08-01 PROCEDURE — ? COUNSELING

## 2018-08-01 PROCEDURE — 99213 OFFICE O/P EST LOW 20 MIN: CPT | Mod: 25

## 2018-08-01 PROCEDURE — ? RECOMMENDATIONS

## 2018-08-01 PROCEDURE — ? PRESCRIPTION

## 2018-08-01 PROCEDURE — ? INTRAMUSCULAR KENALOG

## 2018-08-01 RX ORDER — CLOBETASOL PROPIONATE 0.5 MG/G
OINTMENT TOPICAL
Qty: 1 | Refills: 3 | Status: ERX

## 2018-08-01 RX ORDER — CLOBETASOL PROPIONATE 0.5 MG/G
OINTMENT TOPICAL
Qty: 1 | Refills: 3 | Status: ERX | COMMUNITY
Start: 2018-08-01

## 2018-08-01 RX ADMIN — CLOBETASOL PROPIONATE: 0.5 OINTMENT TOPICAL at 15:02

## 2018-08-01 ASSESSMENT — LOCATION DETAILED DESCRIPTION DERM
LOCATION DETAILED: LEFT BUTTOCK
LOCATION DETAILED: RIGHT DISTAL PRETIBIAL REGION
LOCATION DETAILED: RIGHT PROXIMAL PRETIBIAL REGION
LOCATION DETAILED: RIGHT PROXIMAL PRETIBIAL REGION
LOCATION DETAILED: RIGHT MEDIAL PROXIMAL PRETIBIAL REGION
LOCATION DETAILED: LEFT PROXIMAL PRETIBIAL REGION
LOCATION DETAILED: RIGHT MEDIAL PROXIMAL PRETIBIAL REGION
LOCATION DETAILED: LEFT PROXIMAL PRETIBIAL REGION

## 2018-08-01 ASSESSMENT — LOCATION SIMPLE DESCRIPTION DERM
LOCATION SIMPLE: LEFT PRETIBIAL REGION
LOCATION SIMPLE: RIGHT PRETIBIAL REGION
LOCATION SIMPLE: RIGHT PRETIBIAL REGION
LOCATION SIMPLE: LEFT BUTTOCK
LOCATION SIMPLE: LEFT PRETIBIAL REGION
LOCATION SIMPLE: RIGHT PRETIBIAL REGION

## 2018-08-01 ASSESSMENT — LOCATION ZONE DERM
LOCATION ZONE: LEG
LOCATION ZONE: TRUNK

## 2018-08-01 NOTE — PROCEDURE: BIOPSY BY PUNCH METHOD
Render Post-Care Instructions In Note?: no
Epidermal Sutures: 4-0 Polypropylene
Size Of Lesion In Cm (Optional): 0
Accession #: global
Anesthesia Type: 1% lidocaine with epinephrine
Wound Care: Vaseline
Anesthesia Volume In Cc (Will Not Render If 0): 0.5
Punch Size In Mm: 4
Post-Care Instructions: I reviewed with the patient in detail post-care instructions. Patient is to keep the biopsy site dry overnight, and then apply bacitracin twice daily until healed. Patient may apply hydrogen peroxide soaks to remove any crusting.\\nAft the procedure, the patient was observed for 5-10 minutes and was oriented to person, place, and time.  Denied feeling dizzy, queasy, and stated that they did not feel that they were going to faint.
Consent: Written consent was obtained and risks were reviewed including but not limited to scarring, infection, bleeding, scabbing, incomplete removal, nerve damage and allergy to anesthesia.
Detail Level: Detailed
Hemostasis: None
Dressing: steri-strips
Suture Removal: 10 days
Biopsy Type: H and E
Bill For Surgical Tray: yes
Billing Type: Third-Party Bill
Notification Instructions: Patient will be notified of biopsy results. However, patient instructed to call the office if not contacted within 2 weeks.

## 2018-08-01 NOTE — PROCEDURE: BIOPSY BY PUNCH METHOD
Biopsy Type: H and E
Epidermal Sutures: 4-0 Polypropylene
Suture Removal: 10 days
Detail Level: Detailed
X Size Of Lesion In Cm (Optional): 0
Patient Will Remove Sutures At Home?: No
Notification Instructions: Patient will be notified of biopsy results. However, patient instructed to call the office if not contacted within 2 weeks.
Anesthesia Volume In Cc (Will Not Render If 0): 0.5
Dressing: steri-strips
Consent: Written consent was obtained and risks were reviewed including but not limited to scarring, infection, bleeding, scabbing, incomplete removal, nerve damage and allergy to anesthesia.
Anesthesia Type: 1% lidocaine with epinephrine
Wound Care: Vaseline
Bill For Surgical Tray: yes
Billing Type: Third-Party Bill
Hemostasis: None
Post-Care Instructions: I reviewed with the patient in detail post-care instructions. Patient is to keep the biopsy site dry overnight, and then apply bacitracin twice daily until healed. Patient may apply hydrogen peroxide soaks to remove any crusting.\\nAft the procedure, the patient was observed for 5-10 minutes and was oriented to person, place, and time.  Denied feeling dizzy, queasy, and stated that they did not feel that they were going to faint.
Accession #: global
Punch Size In Mm: 4

## 2018-08-01 NOTE — PROCEDURE: BIOPSY BY PUNCH METHOD
Render Post-Care Instructions In Note?: no
Dressing: steri-strips
Anesthesia Type: 1% lidocaine with epinephrine
Anesthesia Volume In Cc (Will Not Render If 0): 0.5
Size Of Lesion In Cm (Optional): 0
Biopsy Type: H and E
Consent: Written consent was obtained and risks were reviewed including but not limited to scarring, infection, bleeding, scabbing, incomplete removal, nerve damage and allergy to anesthesia.
Hemostasis: None
Post-Care Instructions: I reviewed with the patient in detail post-care instructions. Patient is to keep the biopsy site dry overnight, and then apply bacitracin twice daily until healed. Patient may apply hydrogen peroxide soaks to remove any crusting.\\nAft the procedure, the patient was observed for 5-10 minutes and was oriented to person, place, and time.  Denied feeling dizzy, queasy, and stated that they did not feel that they were going to faint.
Notification Instructions: Patient will be notified of biopsy results. However, patient instructed to call the office if not contacted within 2 weeks.
Detail Level: Detailed
Billing Type: Third-Party Bill
Wound Care: Vaseline
Punch Size In Mm: 4
Suture Removal: 10 days
Was A Bandage Applied: Yes
Accession #: global
Epidermal Sutures: 4-0 Polypropylene

## 2018-08-07 ENCOUNTER — APPOINTMENT (RX ONLY)
Dept: URBAN - METROPOLITAN AREA CLINIC 349 | Facility: CLINIC | Age: 58
Setting detail: DERMATOLOGY
End: 2018-08-07

## 2018-08-07 DIAGNOSIS — L30.9 DERMATITIS, UNSPECIFIED: ICD-10-CM | Status: RESOLVING

## 2018-08-07 PROCEDURE — ? RECOMMENDATIONS

## 2018-08-07 PROCEDURE — 99024 POSTOP FOLLOW-UP VISIT: CPT

## 2018-08-07 PROCEDURE — ? SUTURE REMOVAL (GLOBAL PERIOD)

## 2018-08-07 PROCEDURE — ? COUNSELING

## 2018-08-07 ASSESSMENT — LOCATION SIMPLE DESCRIPTION DERM
LOCATION SIMPLE: LEFT PRETIBIAL REGION
LOCATION SIMPLE: RIGHT PRETIBIAL REGION

## 2018-08-07 ASSESSMENT — LOCATION ZONE DERM: LOCATION ZONE: LEG

## 2018-08-07 NOTE — PROCEDURE: SUTURE REMOVAL (GLOBAL PERIOD)
Detail Level: Detailed
Add 86833 Cpt? (Important Note: In 2017 The Use Of 30989 Is Being Tracked By Cms To Determine Future Global Period Reimbursement For Global Periods): yes

## 2020-10-16 ENCOUNTER — HOSPITAL ENCOUNTER (OUTPATIENT)
Dept: LAB | Age: 60
Discharge: HOME | End: 2020-10-16

## 2020-10-16 PROCEDURE — 88312 SPECIAL STAINS GROUP 1: CPT

## 2020-10-16 PROCEDURE — 88305 TISSUE EXAM BY PATHOLOGIST: CPT

## 2020-12-14 ENCOUNTER — HOSPITAL ENCOUNTER (OUTPATIENT)
Dept: LAB | Age: 60
Discharge: HOME | End: 2020-12-14

## 2020-12-14 PROCEDURE — 88305 TISSUE EXAM BY PATHOLOGIST: CPT

## 2021-07-07 PROBLEM — K76.0 FATTY LIVER: Status: ACTIVE | Noted: 2021-07-07

## 2021-07-07 PROBLEM — Z09 HOSPITAL DISCHARGE FOLLOW-UP: Status: ACTIVE | Noted: 2021-07-07

## 2021-10-19 PROBLEM — N28.1 RENAL CYST, RIGHT: Status: ACTIVE | Noted: 2018-04-01

## 2021-10-19 PROBLEM — E87.29 ALCOHOLIC KETOACIDOSIS: Status: ACTIVE | Noted: 2021-06-27

## 2021-10-19 PROBLEM — R11.2 NON-INTRACTABLE VOMITING WITH NAUSEA: Status: ACTIVE | Noted: 2018-03-27

## 2021-10-19 PROBLEM — E83.39 HYPOPHOSPHATEMIA: Status: ACTIVE | Noted: 2021-05-21

## 2021-10-19 PROBLEM — K92.2 GI BLEED: Status: ACTIVE | Noted: 2021-09-13

## 2021-10-19 PROBLEM — R79.89 ELEVATED LFTS: Status: ACTIVE | Noted: 2018-03-27

## 2021-10-19 PROBLEM — K92.0 COFFEE GROUND EMESIS: Status: ACTIVE | Noted: 2021-09-13

## 2021-10-19 PROBLEM — K29.21 ACUTE ALCOHOLIC GASTRITIS WITH HEMORRHAGE: Status: ACTIVE | Noted: 2021-09-14

## 2021-10-19 PROBLEM — R53.1 GENERALIZED WEAKNESS: Status: ACTIVE | Noted: 2018-04-01

## 2021-10-19 PROBLEM — F10.939 ALCOHOL WITHDRAWAL SYNDROME (HCC): Status: ACTIVE | Noted: 2018-03-27

## 2021-10-19 PROBLEM — E87.20 METABOLIC ACIDOSIS WITH INCREASED ANION GAP AND ACCUMULATION OF ORGANIC ACIDS: Status: ACTIVE | Noted: 2018-03-27

## 2021-10-19 PROBLEM — K20.90 ESOPHAGITIS: Status: ACTIVE | Noted: 2021-09-14

## 2021-10-19 PROBLEM — E87.6 HYPOKALEMIA: Status: ACTIVE | Noted: 2021-05-19

## 2021-10-19 PROBLEM — R93.89 ABNORMAL CXR: Status: ACTIVE | Noted: 2021-05-20

## 2021-10-19 PROBLEM — E83.42 HYPOMAGNESEMIA: Status: ACTIVE | Noted: 2021-09-13

## 2021-10-19 PROBLEM — K80.20 GALLSTONES: Status: ACTIVE | Noted: 2018-03-28
